# Patient Record
Sex: FEMALE | Race: WHITE | Employment: OTHER | URBAN - METROPOLITAN AREA
[De-identification: names, ages, dates, MRNs, and addresses within clinical notes are randomized per-mention and may not be internally consistent; named-entity substitution may affect disease eponyms.]

---

## 2018-02-08 ENCOUNTER — OFFICE VISIT (OUTPATIENT)
Dept: FAMILY MEDICINE | Facility: CLINIC | Age: 38
End: 2018-02-08
Payer: COMMERCIAL

## 2018-02-08 VITALS
RESPIRATION RATE: 18 BRPM | HEIGHT: 64 IN | TEMPERATURE: 101.9 F | SYSTOLIC BLOOD PRESSURE: 108 MMHG | WEIGHT: 120 LBS | BODY MASS INDEX: 20.49 KG/M2 | DIASTOLIC BLOOD PRESSURE: 64 MMHG | HEART RATE: 125 BPM | OXYGEN SATURATION: 99 %

## 2018-02-08 DIAGNOSIS — J02.0 ACUTE STREPTOCOCCAL PHARYNGITIS: ICD-10-CM

## 2018-02-08 DIAGNOSIS — R07.0 THROAT PAIN: Primary | ICD-10-CM

## 2018-02-08 LAB
DEPRECATED S PYO AG THROAT QL EIA: ABNORMAL
FLUAV+FLUBV AG SPEC QL: NEGATIVE
FLUAV+FLUBV AG SPEC QL: NEGATIVE
SPECIMEN SOURCE: ABNORMAL
SPECIMEN SOURCE: NORMAL

## 2018-02-08 PROCEDURE — 87880 STREP A ASSAY W/OPTIC: CPT | Performed by: NURSE PRACTITIONER

## 2018-02-08 PROCEDURE — 87804 INFLUENZA ASSAY W/OPTIC: CPT | Mod: 59 | Performed by: NURSE PRACTITIONER

## 2018-02-08 PROCEDURE — 99213 OFFICE O/P EST LOW 20 MIN: CPT | Performed by: NURSE PRACTITIONER

## 2018-02-08 RX ORDER — PENICILLIN V POTASSIUM 500 MG/1
500 TABLET, FILM COATED ORAL 2 TIMES DAILY
Qty: 20 TABLET | Refills: 0 | Status: SHIPPED | OUTPATIENT
Start: 2018-02-08

## 2018-02-08 RX ORDER — METHYLPREDNISOLONE 4 MG
TABLET, DOSE PACK ORAL
Qty: 21 TABLET | Refills: 0 | Status: SHIPPED | OUTPATIENT
Start: 2018-02-08

## 2018-02-08 NOTE — MR AVS SNAPSHOT
"              After Visit Summary   2018    Judy Hernandez    MRN: [id number]           Patient Information     Date Of Birth          1980        Visit Information        Provider Department      2018 11:00 AM Jes Wallace APRN CNP [facility]        Today's Diagnoses     Throat pain    -  1    Acute streptococcal pharyngitis          Care Instructions    Fluids  Tylenol for fever  Prednisone for swelling and inflammation  Penicillin twice a day for the strep infection           Follow-ups after your visit        Who to contact     If you have questions or need follow up information about today's clinic visit or your schedule please contact [facility] directly at [phone].  Normal or non-critical lab and imaging results will be communicated to you by MyChart, letter or phone within 4 business days after the clinic has received the results. If you do not hear from us within 7 days, please contact the clinic through MyChart or phone. If you have a critical or abnormal lab result, we will notify you by phone as soon as possible.  Submit refill requests through [email or web address] or call your pharmacy and they will forward the refill request to us. Please allow 3 business days for your refill to be completed.          Additional Information About Your Visit        MyChart Information     [email or web address] lets you send messages to your doctor, view your test results, renew your prescriptions, schedule appointments and more. To sign up, go to [email or web address] . Click on \"Log in\" on the left side of the screen, which will take you to the Welcome page. Then click on \"Sign up Now\" on the right side of the page.     You will be asked to enter the access code listed below, as well as some personal information. Please follow the directions to create your username and password.     Your access code is: [email or web address]  Expires: 2018 11:20 AM     Your access code will  in 90 days. If " "you need help or a new code, please call your [facility] clinic or [phone].        Care EveryWhere ID     This is your Care EveryWhere ID. This could be used by other organizations to access your [facility] medical records  [id number]        Your Vitals Were     Pulse Temperature Respirations Height Last Period Pulse Oximetry    125 101.9  F (38.8  C) (Oral) 18 5' 4\" (1.626 m) 02/01/2018 99%    Breastfeeding? BMI (Body Mass Index)                No 20.6 kg/m2           Blood Pressure from Last 3 Encounters:   02/08/18 108/64   03/15/07 110/70    Weight from Last 3 Encounters:   02/08/18 120 lb (54.4 kg)   03/15/07 110 lb (49.9 kg)              We Performed the Following     Influenza A/B antigen     Strep, Rapid Screen          Today's Medication Changes          These changes are accurate as of 2/8/18 11:20 AM.  If you have any questions, ask your nurse or doctor.               Start taking these medicines.        Dose/Directions    methylPREDNISolone 4 MG tablet   Commonly known as:  MEDROL DOSEPAK   Used for:  Throat pain   Started by:  Jes Wallace APRN CNP        Follow package instructions   Quantity:  21 tablet   Refills:  0       penicillin V potassium 500 MG tablet   Commonly known as:  VEETID   Used for:  Acute streptococcal pharyngitis   Started by:  Jes Wallace APRN CNP        Dose:  500 mg   Take 1 tablet (500 mg) by mouth 2 times daily   Quantity:  20 tablet   Refills:  0            Where to get your medicines      These medications were sent to [facility] [place], [place] - [address], [address]  [address], [facility] [address], [place] [address]     Phone:  [phone]     methylPREDNISolone 4 MG tablet    penicillin V potassium 500 MG tablet                Primary Care Provider Office Phone # Fax #    [facility] [place] [phone] [address] [address]        Equal Access to Services     ANNI GUTIERREZ AH: Abhinavii estella prater " maria de jesus Raymundo, victorino blanchardfrank, qamartinata kaaurelia santos, erin yoditin hayaan juan diegoraphael salvatorekiara laByronjayme darien. So [facility] [phone].    ATENCIÓN: Si habla caesarañol, tiene a galvez disposición servicios gratuitos de asistencia lingüística. Perry al [phone].    We comply with applicable federal civil rights laws and [place] laws. We do not discriminate on the basis of race, color, national origin, age, disability, sex, sexual orientation, or gender identity.            Thank you!     Thank you for choosing [facility]  for your care. Our goal is always to provide you with excellent care. Hearing back from our patients is one way we can continue to improve our services. Please take a few minutes to complete the written survey that you may receive in the mail after your visit with us. Thank you!             Your Updated Medication List - Protect others around you: Learn how to safely use, store and throw away your medicines at www.disposemymeds.org.          This list is accurate as of 2/8/18 11:20 AM.  Always use your most recent med list.                   Brand Name Dispense Instructions for use Diagnosis    methylPREDNISolone 4 MG tablet    MEDROL DOSEPAK    21 tablet    Follow package instructions    Throat pain       penicillin V potassium 500 MG tablet    VEETID    20 tablet    Take 1 tablet (500 mg) by mouth 2 times daily    Acute streptococcal pharyngitis

## 2018-02-08 NOTE — LETTER
[facility]  [address] [facility]  [address]  [place]  [address]  Tel: [phone]    February 8, 2018    Judy Hernandez  [address] [facility] [address]        Dear Ms. Hernandez,    The results of your recent Influenza Screen was POSITIVE for Influenza A as discussed with your provider. Enclosed is a copy of the lab test result for your records.      If you have any further questions or problems, please contact our office.      Sincerely,    Jes Wallace NP/DOMINIC          Enclosure: Lab Results      Results for orders placed or performed in visit on 02/08/18   Influenza A/B antigen   Result Value Ref Range    Influenza A/B Agn Specimen Nasal     Influenza A Negative NEG^Negative    Influenza B Negative NEG^Negative   Strep, Rapid Screen   Result Value Ref Range    Specimen Description Throat     Rapid Strep A Screen (A)      POSITIVE: Group A Streptococcal antigen detected by immunoassay.

## 2018-02-08 NOTE — Clinical Note
Please abstract the following data from this visit with this patient into the appropriate field in Epic:  Pap smear done on this date: 2/6/16 (approximately), by this group: Javier Doming[facility], results were normal.

## 2018-02-08 NOTE — PROGRESS NOTES
"  SUBJECTIVE:   Judy Hernandez is a 37 year old female who presents to clinic today for the following health issues:      RESPIRATORY SYMPTOMS      Duration: yesterday at noon    Description  sore throat, cough, fever, chills, headache, fatigue/malaise and myalgias    Severity: severe    Accompanying signs and symptoms: back pain    History (predisposing factors):  strep exposure and influenza exposure    Precipitating or alleviating factors: None    Therapies tried and outcome:  rest and fluids, ibuprofen made her sick.       Problem list and histories reviewed & adjusted, as indicated.  Additional history: as documented    There is no problem list on file for this patient.    No past surgical history on file.    Social History   Substance Use Topics     Smoking status: Never Smoker     Smokeless tobacco: Never Used     Alcohol use Not on file     No family history on file.      Current Outpatient Prescriptions   Medication Sig Dispense Refill     methylPREDNISolone (MEDROL DOSEPAK) 4 MG tablet Follow package instructions 21 tablet 0     penicillin V potassium (VEETID) 500 MG tablet Take 1 tablet (500 mg) by mouth 2 times daily 20 tablet 0     Allergies   Allergen Reactions     Sulfa Drugs        Reviewed and updated as needed this visit by clinical staff  Allergies       Reviewed and updated as needed this visit by Provider         ROS:  Constitutional, HEENT, cardiovascular, pulmonary, gi and gu systems are negative, except as otherwise noted.    OBJECTIVE:     /64  Pulse 125  Temp 101.9  F (38.8  C) (Oral)  Resp 18  Ht 5' 4\" (1.626 m)  Wt 120 lb (54.4 kg)  LMP 02/01/2018  SpO2 99%  Breastfeeding? No  BMI 20.6 kg/m2  Body mass index is 20.6 kg/(m^2).  GENERAL: healthy, alert and moderate to severe distress, febrile  EYES: Eyes grossly normal to inspection, PERRL and conjunctivae and sclerae normal  HENT: ear canals and TM's normal, nose and mouth without ulcers or lesions, tonsillar enlargement " and erythema, no asymetry  NECK: bialteral anterior cervical lympadenopathy, no asymmetry,   RESP: lungs clear to auscultation - no rales, rhonchi or wheezes  CV: tachycardic , regular rate and rhythm, normal S1 S2, no S3 or S4, no murmur, click or rub, no peripheral edema    Diagnostic Test Results:  Results for orders placed or performed in visit on 02/08/18   Influenza A/B antigen   Result Value Ref Range    Influenza A/B Agn Specimen Nasal     Influenza A Negative NEG^Negative    Influenza B Negative NEG^Negative   Strep, Rapid Screen   Result Value Ref Range    Specimen Description Throat     Rapid Strep A Screen (A)      POSITIVE: Group A Streptococcal antigen detected by immunoassay.       ASSESSMENT/PLAN:       ICD-10-CM    1. Throat pain R07.0 Influenza A/B antigen     Strep, Rapid Screen     methylPREDNISolone (MEDROL DOSEPAK) 4 MG tablet   2. Acute streptococcal pharyngitis J02.0 penicillin V potassium (VEETID) 500 MG tablet       Patient Instructions   Fluids  Tylenol for fever  Prednisone for swelling and inflammation  Penicillin twice a day for the strep infection       AMARILYS Salazar [facility]

## 2018-02-08 NOTE — NURSING NOTE
"Chief Complaint   Patient presents with     URI     Pharyngitis       Initial /64  Temp 101.9  F (38.8  C) (Oral)  Resp 18  Ht 5' 4\" (1.626 m)  Wt 120 lb (54.4 kg)  LMP 02/01/2018  Breastfeeding? No  BMI 20.6 kg/m2 Estimated body mass index is 20.6 kg/(m^2) as calculated from the following:    Height as of this encounter: 5' 4\" (1.626 m).    Weight as of this encounter: 120 lb (54.4 kg).  Medication Reconciliation: complete  "

## 2018-02-08 NOTE — PATIENT INSTRUCTIONS
Fluids  Tylenol for fever  Prednisone for swelling and inflammation  Penicillin twice a day for the strep infection

## 2018-12-09 ENCOUNTER — HOSPITAL ENCOUNTER (EMERGENCY)
Facility: CLINIC | Age: 38
Discharge: HOME | End: 2018-12-09
Attending: EMERGENCY MEDICINE | Admitting: EMERGENCY MEDICINE
Payer: COMMERCIAL

## 2018-12-09 VITALS
BODY MASS INDEX: 20.49 KG/M2 | WEIGHT: 120 LBS | SYSTOLIC BLOOD PRESSURE: 113 MMHG | TEMPERATURE: 98.8 F | DIASTOLIC BLOOD PRESSURE: 75 MMHG | HEART RATE: 84 BPM | HEIGHT: 64 IN | RESPIRATION RATE: 20 BRPM | OXYGEN SATURATION: 96 %

## 2018-12-09 DIAGNOSIS — F10.10 ALCOHOL ABUSE: ICD-10-CM

## 2018-12-09 DIAGNOSIS — F43.0 ACUTE REACTION TO STRESS: ICD-10-CM

## 2018-12-09 PROCEDURE — 90791 PSYCH DIAGNOSTIC EVALUATION: CPT

## 2018-12-09 PROCEDURE — 99285 EMERGENCY DEPT VISIT HI MDM: CPT | Mod: 25

## 2018-12-09 PROCEDURE — 25000132 ZZH RX MED GY IP 250 OP 250 PS 637: Performed by: EMERGENCY MEDICINE

## 2018-12-09 RX ORDER — LORAZEPAM 0.5 MG/1
0.5 TABLET ORAL EVERY 8 HOURS PRN
Qty: 20 TABLET | Refills: 0 | Status: SHIPPED | OUTPATIENT
Start: 2018-12-09 | End: 2019-06-07

## 2018-12-09 RX ORDER — LORAZEPAM 1 MG/1
1 TABLET ORAL ONCE
Status: COMPLETED | OUTPATIENT
Start: 2018-12-09 | End: 2018-12-09

## 2018-12-09 RX ADMIN — LORAZEPAM 1 MG: 1 TABLET ORAL at 12:27

## 2018-12-09 ASSESSMENT — ENCOUNTER SYMPTOMS
NERVOUS/ANXIOUS: 1
SLEEP DISTURBANCE: 1

## 2018-12-09 ASSESSMENT — MIFFLIN-ST. JEOR: SCORE: 1209.32

## 2018-12-09 NOTE — ED NOTES
Bed: ED18  Expected date:   Expected time:   Means of arrival:   Comments:  Triage--anxiety. ? ETOH withdrawal

## 2018-12-09 NOTE — ED NOTES
Patient seen by me only for discharge.  Patient states she feels much better.   is driving pt home.  VSS. Emotional support given

## 2018-12-09 NOTE — ED AVS SNAPSHOT
Emergency Department  [address] [facility] [address]  Phone:  [phone]  Fax:  [phone]                                    Judy Hernandez   MRN: [id number]    Department:   Emergency Department   Date of Visit:  12/9/2018           After Visit Summary Signature Page    I have received my discharge instructions, and my questions have been answered. I have discussed any challenges I see with this plan with the nurse or doctor.    ..........................................................................................................................................  Patient/Patient Representative Signature      ..........................................................................................................................................  Patient Representative Print Name and Relationship to Patient    ..................................................               ................................................  Date                                   Time    ..........................................................................................................................................  Reviewed by Signature/Title    ...................................................              ..............................................  Date                                               Time          22EPIC Rev 08/18

## 2018-12-09 NOTE — DISCHARGE INSTRUCTIONS
Discharge Instructions  Mental Health Concerns    You were seen today for mental health concerns, such as depression, anxiety, or suicidal thinking. Your provider feels that you do not require hospitalization at this time. However, your symptoms may become worse, and you may need to return to the Emergency Department. Most treatments of depression and suicidal thoughts are a process rather than a single intervention.  Medications and counseling can take several weeks or more to help.    Generally, every Emergency Department visit should have a follow-up clinic visit with either a primary or a specialty clinic/provider. Please follow-up as instructed by your emergency provider today.    By accepting these discharge instructions:  You promise to not harm yourself or others.  You agree that if you feel you are becoming unable to keep that promise, you will do something to help yourself before you do anything to harm yourself or others.   You agree to keep any safety plan arranged on your visit here today.  You agree to take any medication prescribed or recommended by your provider.  If you are getting worse, you can contact a friend or a family member, contact your counselor or family provider, contact a crisis line, or other options discussed with the provider or therapist today.  At any time, you can call 911 and return to the Emergency Department for more help.  You understand that follow-up is essential to your treatment, and you will make and keep appointments recommended on your visit today.    How to improve your mental health and prevent suicide:  Involve others by letting family, friends, counselors know.  Do not isolate yourself.  Avoid alcohol or drugs. Remove weapons, poisons from your home.  Try to stick to routines for eating, sleeping and getting regular exercise.    Try to get into sunlight. Bright natural light not only treats seasonal affective disorder but also depression.  Increase safe activities  that you enjoy.    If you feel worse, contact 1-800-suicide (1-880.418.6106), or call 911, or your primary provider/counselor for additional assistance.    If you were given a prescription for medicine here today, be sure to read all of the information (including the package insert) that comes with your prescription.  This will include important information about the medicine, its side effects, and any warnings that you need to know about.  The pharmacist who fills the prescription can provide more information and answer questions you may have about the medicine.  If you have questions or concerns that the pharmacist cannot address, please call or return to the Emergency Department.   Remember that you can always come back to the Emergency Department if you are not able to see your regular provider in the amount of time listed above, if you get any new symptoms, or if there is anything that worries you.

## 2018-12-09 NOTE — ED PROVIDER NOTES
History     Chief Complaint:  Anxiety    HPI    Judy Hernandez is a generally healthy 38 year old female who exercises regularly with a history of anxiety and ADHD who presents with anxiety. The patient is a daily drinker and endorses a bottle of wine every night. She does not drink in the mornings or through the day and does not usually mix wine with hard liquor, though she does so on the weekends. She drinks coffee in the morning daily. She was at a party last night and says she had too much vodka, blacking out and throwing up all night. She woke up this morning with symptoms of chest pain, palpitations, and shortness of breath which she describes as a panic attack and possible withdrawal. 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 She also doesn't sleep well. She saw a psychiatrist for her ADHD previously and did well on Adderall, but was put on a non-stimulant medication experimentally. She did not tolerate this well and was taken off, but never followed up to be put back on the Adderall. She denies thoughts of hurting herself or others. She says she feels embarrassed.     Allergies:  Sulfa drugs     Medications:    Methylprednisone  Veetid    Past Medical History:    Anxiety  ADHD    Past Surgical History:    History reviewed. No pertinent surgical history.    Family History:    History reviewed. No pertinent family history.      Social History:  Smoking Status: Never Smoker  Alcohol Use: Yes  Patient presents alone.   Marital Status:        Review of Systems   Psychiatric/Behavioral: Positive for sleep disturbance. Negative for self-injury and suicidal ideas. The patient is nervous/anxious.    All other systems reviewed and are negative.    Physical Exam     Patient Vitals for the past 24 hrs:   BP Temp Temp src Pulse Resp SpO2 Height Weight   12/09/18 1330 113/75 --  "-- -- -- 96 % -- --   12/09/18 1315 -- -- -- -- -- 97 % -- --   12/09/18 1300 114/77 -- -- -- -- 96 % -- --   12/09/18 1245 -- -- -- -- -- 99 % -- --   12/09/18 1230 118/88 -- -- 84 -- 99 % -- --   12/09/18 1138 (!) 136/106 98.8  F (37.1  C) Temporal 123 20 97 % 1.626 m (5' 4\") 54.4 kg (120 lb)      Physical Exam  Eye:  Pupils are equal, round, and reactive.  Extraocular movements intact.    ENT:  No rhinorrhea.  Moist mucus membranes.  Normal tongue and tonsil.    Cardiac:  Regular rate and rhythm.  No murmurs, gallops, or rubs.    Pulmonary:  Clear to auscultation bilaterally.  No wheezes, rales, or rhonchi.    Abdomen:  Positive bowel sounds.  Abdomen is soft and non-distended, without focal tenderness.    Musculoskeletal:  Normal movement of all extremities without evidence for deficit.    Skin:  Warm and dry without rashes.    Neurologic:  Non-focal exam without asymmetric weakness or numbness.     Psychiatric:  Patient is tearful and anxious with otherwise reasonable insight into her stressors and anxiety today. No SI or HI.     Emergency Department Course     Interventions:  1227 - Ativan 1 mg tablet PO    Emergency Department Course:  Past medical records, nursing notes, and vitals reviewed.  1139: I performed an exam of the patient and obtained history, as documented above.    1330: I rechecked the patient. Findings and plan explained to the Patient. Patient discharged home with instructions regarding supportive care, medications, and reasons to return. The importance of close follow-up was reviewed.       Impression & Plan      Medical Decision Making:  This delightful and unfortunate 38-year-old woman presents to us with a acute reaction to stress this morning.  In speaking with the patient, she appears to be under an extreme amount of stress.  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  She describes feelings of helplessness and worthlessness, being a mother who cannot \"keep all the balls in the air\" " "between all of the children's activities and their homework.  She does not always feel that her  is terribly supportive.  To deal with all of her stress, she admits to drinking alcohol in the evenings, typically between 2 and 5 glasses.  She has voiced concerns that she may have a problem with alcohol, though she has never had alcohol withdrawal, never drinks in the morning, and has otherwise had no ill effects from her alcohol consumption.  She was at a [place] party last night where she had her typical amount of wine and then had several hard liquor cocktails which resulted in her vomiting several times through the night as she \"overdid it.\"  She then felt very shaky this morning and could not get control of her heart rate and breathing.  She notes that she has struggled with anxiety and panic attacks in the past and feels that this is similar, but when she could not get her symptoms under control, she felt no other option but to come to the emergency department because she felt so poorly.    On my exam, the patient is very tearful and anxious.  However, she denies suicidal or homicidal thoughts.  Her physical exam is otherwise unremarkable.  I gave her a single dose of oral lorazepam with complete and total resolution of all of her symptoms and improvement in her heart rate.  We spoke at length about alcoholism and using it as a drug to calm anxiety in the evening and that this is a slippery slope and is dangerous.  She does not appear to be showing any overt signs of the damaging effects of alcohol physically or in a law enforcement or relationship situation.  Nonetheless, we did discuss reducing her alcohol intake.  I asked my DEC liaison to evaluate the patient and he was able to get her information for close follow-up with a therapist and a psychiatrist.  They will further discuss whether they will start daily medication.  In the meantime, I will prescribe her a limited supply of lorazepam which she " can use if she experiences further issues of out-of-control anxiety, though I was clear she should never mix these with alcohol.  We did discuss doing other lab work looking at her thyroid function and her liver function tests, though the patient notes that she is otherwise been feeling very well, exercising daily, and having no overt issues of vomiting, diarrhea, abdominal pain, etc.  She elects to hold off on any blood work at this time which I support.  However, I advised that she return to us immediately if she has any worsening of her conditions or other emergent concerns.    Diagnosis:    ICD-10-CM    1. Acute reaction to stress F43.0    2. Alcohol abuse F10.10      Disposition:  Discharged to home.     Discharge Medications:  START taking      Dose / Directions   LORazepam 0.5 MG tablet  Commonly known as:  ATIVAN      Dose:  0.5 mg  Take 1 tablet (0.5 mg) by mouth every 8 hours as needed for anxiety  Quantity:  20 tablet  Refills:  0           Chicho Robbins  12/9/2018    EMERGENCY DEPARTMENT    Scribe Disclosure:  I, Chicho Robbins, am serving as a scribe at 11:56 AM on 12/9/2018 to document services personally performed by Trierweiler, Chad A, MD based on my observations and the provider's statements to me.        Trierweiler, Chad A, MD  12/10/18 3934